# Patient Record
Sex: MALE | Race: WHITE | Employment: FULL TIME | URBAN - METROPOLITAN AREA
[De-identification: names, ages, dates, MRNs, and addresses within clinical notes are randomized per-mention and may not be internally consistent; named-entity substitution may affect disease eponyms.]

---

## 2017-03-13 ENCOUNTER — HOSPITAL ENCOUNTER (OUTPATIENT)
Dept: LAB | Age: 67
Discharge: HOME | End: 2017-03-13
Payer: COMMERCIAL

## 2017-03-13 LAB
BASOPHILS # BLD AUTO: 0 K/UL (ref 0–0.06)
BASOPHILS # BLD: 0 % (ref 0–2)
DIFFERENTIAL METHOD BLD: ABNORMAL
EOSINOPHIL # BLD: 0.2 K/UL (ref 0–0.4)
EOSINOPHIL NFR BLD: 5 % (ref 0–5)
ERYTHROCYTE [DISTWIDTH] IN BLOOD BY AUTOMATED COUNT: 13.4 % (ref 11.6–14.5)
HCT VFR BLD AUTO: 39.7 % (ref 36–48)
HGB BLD-MCNC: 13.7 G/DL (ref 13–16)
LYMPHOCYTES # BLD AUTO: 42 % (ref 21–52)
LYMPHOCYTES # BLD: 1.5 K/UL (ref 0.9–3.6)
MCH RBC QN AUTO: 31.7 PG (ref 24–34)
MCHC RBC AUTO-ENTMCNC: 34.5 G/DL (ref 31–37)
MCV RBC AUTO: 91.9 FL (ref 74–97)
MONOCYTES # BLD: 0.6 K/UL (ref 0.05–1.2)
MONOCYTES NFR BLD AUTO: 17 % (ref 3–10)
NEUTS SEG # BLD: 1.3 K/UL (ref 1.8–8)
NEUTS SEG NFR BLD AUTO: 36 % (ref 40–73)
PLATELET # BLD AUTO: 156 K/UL (ref 135–420)
PMV BLD AUTO: 10.5 FL (ref 9.2–11.8)
RBC # BLD AUTO: 4.32 M/UL (ref 4.7–5.5)
WBC # BLD AUTO: 3.5 K/UL (ref 4.6–13.2)

## 2017-03-13 PROCEDURE — 36415 COLL VENOUS BLD VENIPUNCTURE: CPT | Performed by: INTERNAL MEDICINE

## 2017-03-13 PROCEDURE — 85025 COMPLETE CBC W/AUTO DIFF WBC: CPT | Performed by: INTERNAL MEDICINE

## 2023-11-28 ENCOUNTER — HOSPITAL ENCOUNTER (OUTPATIENT)
Facility: HOSPITAL | Age: 73
Setting detail: RECURRING SERIES
Discharge: HOME | End: 2023-12-01
Payer: COMMERCIAL

## 2023-11-28 PROCEDURE — 97110 THERAPEUTIC EXERCISES: CPT

## 2023-11-28 PROCEDURE — 97161 PT EVAL LOW COMPLEX 20 MIN: CPT

## 2023-11-28 NOTE — PROGRESS NOTES
[facility] at [facility], [address] [place] [address] Drive  Phone: [phone]   Fax: [phone]    Plan of Care/ Statement of Necessity for Physical Therapy Services        Patient name: Veronica Trent Start of Care: 2023   Referral source: Arthur Gallego MD : 1950    Medical Diagnosis: Pain in left knee [M25.562]  Pain in right knee [M25.561]      Onset Date:10/15/2023    Treatment Diagnosis:  M25.561  RIGHT KNEE PAIN and M25.562  LEFT KNEE PAIN                                           Prior Hospitalization: see medical history Provider#: [id number]   Medications: Verified on Patient Summary List     Comorbidities: obesity, pre-diabetic, sleep apnea. Surgical Hx: four lumbar surgeries, fused T10-S1  Prior Level of Function: ambulatory community distances without device, no difficulty with gait on level or stairs      The Plan of Care and following information is based on the information from the initial evaluation. Assessment/ key information: Patient presents with report of recent relatively sudden onset bilateral LE functional weakness with marked difficulty safely performing bed, chair and toilet transfers, inability to ambulate without rolling walker, and recurrent falls due to knees bucking during ambulation. Patient also exhibits decreased coordination bilateral LE and decreased sensation bilateral plantar surfaces of feet, indicating likelihood of neurological component to functional strength deficits. Patient reports he has difficulty transporting self to clinic and may seek transition to home PT.  Patient instructed to discuss home versus outpatient PT with referring MD.    Patient will continue to benefit from skilled PT services to modify and progress therapeutic interventions, address functional mobility deficits, address ROM deficits, address strength deficits, analyze and address soft tissue restrictions, analyze and cue movement patterns, analyze and
30 Degrees [x] Neg    [] Pos Ely's Test  [x] Neg    [] Pos  Apley's Compression [x] Neg    [] Pos Marcell's Test  [x] Neg    [] Pos  Apley's Distraction [x] Neg    [] Pos Stroke Test  [] Neg    [] Pos   Anterior Drawer [x] Neg    [] Pos Fluctuation Test [] Neg    [] Pos  Thessaly's Test:                  [] Neg    [] Pos     Lever Sign:              Other tests/comments:   30 Second Sit to Stand Test: 5 repetitions with heavy use of arm rests  TUG Test: 54.5 seconds with use of rw and heavy use of arm rests to rise   Heel to Roger Coordination Test: moderate difficulty with hip and knee control bilaterally    Pain Level (0-10 scale) post treatment: 0    ASSESSMENT/Changes in Function: Patient presents with report of recent relatively sudden onset bilateral LE functional weakness with marked difficulty safely performing bed, chair and toilet transfers, inability to ambulate without rolling walker, and recurrent falls due to knees bucking during ambulation. Patient also exhibits decreased coordination bilateral LE and decreased sensation bilateral plantar surfaces of feet, indicating likelihood of neurological component to functional strength deficits. Patient reports he has difficulty transporting self to clinic and may seek transition to home PT. Patient instructed to discuss home versus outpatient PT with referring MD.    Patient will continue to benefit from skilled PT services to modify and progress therapeutic interventions, address functional mobility deficits, address ROM deficits, address strength deficits, analyze and address soft tissue restrictions, analyze and cue movement patterns, analyze and modify body mechanics/ergonomics and assess and modify postural abnormalities to attain remaining goals.      [x]  See Plan of Care  []  See progress note/recertification  []  See Discharge Summary         Progress towards goals / Updated goals:  See POC    PLAN  []  Upgrade activities as tolerated     [x]  Continue

## 2023-12-08 ENCOUNTER — TELEPHONE (OUTPATIENT)
Facility: HOSPITAL | Age: 73
End: 2023-12-08

## 2023-12-08 NOTE — TELEPHONE ENCOUNTER
Per conversation with patient, he is having additional testing done week before [facility]. Office to follow up on 12/21 with patient.

## 2023-12-28 ENCOUNTER — TELEPHONE (OUTPATIENT)
Facility: HOSPITAL | Age: 73
End: 2023-12-28

## 2023-12-29 ENCOUNTER — HOSPITAL ENCOUNTER (OUTPATIENT)
Facility: HOSPITAL | Age: 73
Discharge: HOME | End: 2023-12-29
Attending: ORTHOPAEDIC SURGERY
Payer: COMMERCIAL

## 2023-12-29 DIAGNOSIS — M54.50 LOW BACK PAIN, UNSPECIFIED BACK PAIN LATERALITY, UNSPECIFIED CHRONICITY, UNSPECIFIED WHETHER SCIATICA PRESENT: ICD-10-CM

## 2023-12-29 PROCEDURE — 72146 MRI CHEST SPINE W/O DYE: CPT

## 2024-06-03 ENCOUNTER — TELEPHONE (OUTPATIENT)
Facility: HOSPITAL | Age: 74
End: 2024-06-03

## 2024-06-19 ENCOUNTER — TELEPHONE (OUTPATIENT)
Facility: HOSPITAL | Age: 74
End: 2024-06-19

## 2024-06-21 ENCOUNTER — HOSPITAL ENCOUNTER (OUTPATIENT)
Facility: HOSPITAL | Age: 74
Setting detail: RECURRING SERIES
Discharge: HOME | End: 2024-06-24
Payer: COMMERCIAL

## 2024-06-21 PROCEDURE — 97161 PT EVAL LOW COMPLEX 20 MIN: CPT

## 2024-06-21 PROCEDURE — 97110 THERAPEUTIC EXERCISES: CPT

## 2024-06-21 NOTE — PROGRESS NOTES
[facility] at [facility]  [address] [address]  Phone: [phone]   Fax: [phone]    Plan of Care/ Statement of Necessity for Physical Therapy Services        Patient name: Dontrell Helm Start of Care: 2024   Referral source: Serenity Tran PA : 1950    Medical Diagnosis: Pain in thoracic spine [M54.6]  Other low back pain [M54.59]      Onset Date:3/25/2024    Treatment Diagnosis:  M54.6  THORACIC PAIN and M54.59  OTHER LOWER BACK PAIN                                          Prior Hospitalization: see medical history Provider#: [id number]   Medications: Verified on Patient Summary List     Comorbidities: multiple spinal surgeries, recurrent falls, hearing impaired, HTN  Prior Level of Function: independent with community ambulation w/o device, regularly walks dog one mile.      The Plan of Care and following information is based on the information from the initial evaluation.  Assessment/ key information: Patient presents with intermittent thoracic and lumbar pain; balance and proprioception deficiencies as well as gait abnormalities that have resulted in repeated falls and safety concerns; and strength and mobility deficits s/p T4-iliac fusion 3/25/2024. Patient reports over a decade history of thoracic and lumbar surgeries to relieve pain and is motivated to participate in PT to return to performing household duties, ambulating in the community and preventing future falls.      Patient will continue to benefit from skilled PT services to modify and progress therapeutic interventions, address functional mobility deficits, address ROM deficits, address strength deficits, analyze and address soft tissue restrictions, analyze and cue movement patterns, analyze and modify body mechanics/ergonomics and assess and modify postural abnormalities to attain remaining goals.    Evaluation Complexity HistoryHIGH Complexity :3+ comorbidities / personal factors will

## 2024-06-21 NOTE — PROGRESS NOTES
PT DAILY TREATMENT NOTE/LUMBAR EVAL 10-18    Patient Name: Dontrell Helm  Date:2024  : 1950  [x]  Patient  Verified  Payor: VARSHA / Plan: MCKAYLA MADDOX FEDERAL / Product Type: *No Product type* /    In time:1410  Out time:1500  Total Treatment Time (min): 50  Visit #: 1 of 15    Treatment Area: Pain in thoracic spine [M54.6]  Other low back pain [M54.59]  SUBJECTIVE  Pain Level (0-10 scale): 0-7/10  []constant [x]intermittent [x]improving []worsening []no change since onset    Any medication changes, allergies to medications, adverse drug reactions, diagnosis change, or new procedure performed?: [x] No    [] Yes (see summary sheet for update)  Subjective functional status/changes:       Patient presents s/p surgical fusion T4-S1 performed 3/25/2024 due to prior thoracic and lumbar fractures from falls and resultant severe pain and loss of function. Patient received post op inpatient and home PT with significant reduction in pain noted but persistence of bilateral LE weakness, reduced bilateral LE motor control and persistence of recurrent falls. Patient now seeking PT intervention to assist in restoring functional mobility and maximizing safety in mobility. Patient describes pain as intermittent and is also experiencing numbness in his feet bilaterally. Denies popping/clicking. Aggravating factors:static positions. Alleviating factors: changing positions.  Denies red flags: SOB, chest pain, dizziness/lightheadedness, blurred/double vision, HA, chills/fevers, night sweats, change in bowel/bladder control, abdominal pain, difficulty swallowing, slurred speech, unexplained weight gain/loss, nausea, vomiting. PMHx: hearing impaired, HTN . Surgical Hx: Thyroid removal, multiple back fusions, left great toe fusion. Social Hx: wife uses walker, hiring help for dog, two story home, other family 30+ minutes away, work status: full time employment doing administrative duties, some travel. PLOF: independent in

## 2024-06-24 ENCOUNTER — TELEPHONE (OUTPATIENT)
Facility: HOSPITAL | Age: 74
End: 2024-06-24

## 2024-07-03 ENCOUNTER — HOSPITAL ENCOUNTER (OUTPATIENT)
Facility: HOSPITAL | Age: 74
Setting detail: RECURRING SERIES
Discharge: HOME | End: 2024-07-06
Payer: COMMERCIAL

## 2024-07-03 PROCEDURE — 97112 NEUROMUSCULAR REEDUCATION: CPT

## 2024-07-03 PROCEDURE — 97110 THERAPEUTIC EXERCISES: CPT

## 2024-07-03 PROCEDURE — 97530 THERAPEUTIC ACTIVITIES: CPT

## 2024-07-03 NOTE — PROGRESS NOTES
Long Term Goals: To be accomplished in 8 weeks:                 Patient will increase Abdullahi LE strength to 5/5 MMT throughout to aid in completion of ADLs.                 Status at IE: 3+ Hip flex, knee flex/ext, ankle dorsiflexion/plantarflexion                 Current: Same as IE                    Patient will report pain no greater than 1-3/10 throughout entire day to aid in completion of ADLs.                 Status at IE: 4-7/10                 Current: Same as IE                    Patient will decrease TUG by 14 seconds to demonstrate increased safety in mobility.                 Status at IE: 26.04 with RW                 Current: Same as IE                    Patient will improve 30 second sit to stand score to 11 repetitions to demonstrate increased proximal bilateral LE strength and associated increased safety in mobility.                 Status at IE: 5 w/ CG                 Current: Same as IE                    Patient will improve Oswestry Disability Index for Low Back Pain/Dysfunction score by MCID of 13%  to demonstrate improvement in functional status.                 Status at IE:36% ability (64%disability)                 Current: Same as IE    PLAN  Yes  Continue plan of care  []  Upgrade activities as tolerated  []  Discharge due to:   []  Other:    Jaelyn Ignacio, SPT    7/3/2024    3:52 PM    Future Appointments   Date Time Provider Department Center   7/12/2024  1:30 PM Geovani Ji, [facility]   7/19/2024  1:30 PM Geovani Ji, PT [facility]   7/26/2024  1:30 PM Geovani Ji, [facility]

## 2024-07-12 ENCOUNTER — HOSPITAL ENCOUNTER (OUTPATIENT)
Facility: HOSPITAL | Age: 74
Setting detail: RECURRING SERIES
Discharge: HOME | End: 2024-07-15
Payer: COMMERCIAL

## 2024-07-12 PROCEDURE — 97530 THERAPEUTIC ACTIVITIES: CPT

## 2024-07-12 PROCEDURE — 97110 THERAPEUTIC EXERCISES: CPT

## 2024-07-12 PROCEDURE — 97112 NEUROMUSCULAR REEDUCATION: CPT

## 2024-07-12 NOTE — PROGRESS NOTES
PHYSICAL / OCCUPATIONAL THERAPY - DAILY TREATMENT NOTE (updated )    Patient Name: Dontrell Helm    Date: 2024    : 1950  Insurance: Payor: VARSHA / Plan: MCKAYLA MADDOX FEDERAL / Product Type: *No Product type* /      Patient  verified Yes     Visit #   Current / Total 3 16   Time   In / Out 1330 1426   Pain   In / Out 4 4   Subjective Functional Status/Changes: \"Life has been busy this past week. My wife and I are both patients. Pain is staying the same.\"   Changes to:  Meds, Allergies, Med Hx, Sx Hx?  If yes, update Summary List no       TREATMENT AREA =  Pain in thoracic spine [M54.6]  Other low back pain [M54.59]    OBJECTIVE    Therapeutic Procedures:  Tx Min Billable or 1:1 Min (if diff from Tx Min) Procedure, Rationale, Specifics   26 21 65358 Therapeutic Exercise (timed):  increase ROM, strength, coordination, balance, and proprioception to improve patient's ability to progress to PLOF and address remaining functional goals. (see flow sheet as applicable)     Details if applicable:       15  33795 Therapeutic Activity (timed):  use of dynamic activities replicating functional movements to increase ROM, strength, coordination, balance, and proprioception in order to improve patient's ability to progress to PLOF and address remaining functional goals.  (see flow sheet as applicable)     Details if applicable:     15  02304 Neuromuscular Re-Education (timed):  improve balance, coordination, kinesthetic sense, posture, core stability and proprioception to improve patient's ability to develop conscious control of individual muscles and awareness of position of extremities in order to progress to PLOF and address remaining functional goals. (see flow sheet as applicable)     Details if applicable:     56 51 [facility] Totals Reminder: bill using total billable min of TIMED therapeutic procedures (example: do not include dry needle or estim unattended, both untimed codes, in totals to left)  8-22 min

## 2024-07-19 ENCOUNTER — HOSPITAL ENCOUNTER (OUTPATIENT)
Facility: HOSPITAL | Age: 74
Setting detail: RECURRING SERIES
Discharge: HOME | End: 2024-07-22
Payer: COMMERCIAL

## 2024-07-19 PROCEDURE — 97530 THERAPEUTIC ACTIVITIES: CPT

## 2024-07-19 PROCEDURE — 97112 NEUROMUSCULAR REEDUCATION: CPT

## 2024-07-19 PROCEDURE — 97110 THERAPEUTIC EXERCISES: CPT

## 2024-07-19 NOTE — PROGRESS NOTES
PHYSICAL / OCCUPATIONAL THERAPY - DAILY TREATMENT NOTE (updated )    Patient Name: Dontrell Helm    Date: 2024    : 1950  Insurance: Payor: VARSHA / Plan: MCKAYLA MADDOX FEDERAL / Product Type: *No Product type* /      Patient  verified Yes     Visit #   Current / Total 4 16   Time   In / Out 1320 1419   Pain   In / Out 4 4   Subjective Functional Status/Changes: \"I had to get up in the middle of night last and I had a lot of fear getting around in the dark.\"   Changes to:  Meds, Allergies, Med Hx, Sx Hx?  If yes, update Summary List no       TREATMENT AREA =  Pain in thoracic spine [M54.6]  Other low back pain [M54.59]    OBJECTIVE    Therapeutic Procedures:  Tx Min Billable or 1:1 Min (if diff from Tx Min) Procedure, Rationale, Specifics   29 24 53211 Therapeutic Exercise (timed):  increase ROM, strength, coordination, balance, and proprioception to improve patient's ability to progress to PLOF and address remaining functional goals. (see flow sheet as applicable)     Details if applicable:       15  89858 Therapeutic Activity (timed):  use of dynamic activities replicating functional movements to increase ROM, strength, coordination, balance, and proprioception in order to improve patient's ability to progress to PLOF and address remaining functional goals.  (see flow sheet as applicable)     Details if applicable:     15  57536 Neuromuscular Re-Education (timed):  improve balance, coordination, kinesthetic sense, posture, core stability and proprioception to improve patient's ability to develop conscious control of individual muscles and awareness of position of extremities in order to progress to PLOF and address remaining functional goals. (see flow sheet as applicable)     Details if applicable:     59 54 [facility] Totals Reminder: bill using total billable min of TIMED therapeutic procedures (example: do not include dry needle or estim unattended, both untimed codes, in totals to left)  8-

## 2024-07-26 ENCOUNTER — HOSPITAL ENCOUNTER (OUTPATIENT)
Facility: HOSPITAL | Age: 74
Setting detail: RECURRING SERIES
Discharge: HOME | End: 2024-07-29
Payer: COMMERCIAL

## 2024-07-26 PROCEDURE — 97110 THERAPEUTIC EXERCISES: CPT

## 2024-07-26 PROCEDURE — 97530 THERAPEUTIC ACTIVITIES: CPT

## 2024-07-26 PROCEDURE — 97112 NEUROMUSCULAR REEDUCATION: CPT

## 2024-07-26 NOTE — PROGRESS NOTES
38-52 min = 3 units; 53-67 min = 4 units; 68-82 min = 5 units   Total Total       TOTAL TREATMENT TIME:   60       [x]  Patient Education billed concurrently with other procedures   [x] Review HEP    [] Progressed/Changed HEP, detail:    [] Other detail:       Objective Information/Functional Measures/Assessment  Patient has been consistent and diligent with his HEP, well motivated at PT and as such has shown progress towards his pain reduction, improved ROM and functional activity goals. Patient was introduced to an AFO with propulsion that can be attached to the outside of his shoe. The device was placed on his right foot as a way to assist with his right foot drop. Patient reported feeling the device provided assistance and could see in a mirror the improvements it made in his gate. Patient was provided with documentation and contact information if he was interested in pursuing having one fitted.    Patient will continue to benefit from skilled PT / OT services to modify and progress therapeutic interventions, analyze and address functional mobility deficits, analyze and address ROM deficits, analyze and address strength deficits, analyze and address soft tissue restrictions, analyze and cue for proper movement patterns, analyze and modify for postural abnormalities, analyze and address imbalance/dizziness, and instruct in home and community integration to address functional deficits and attain remaining goals.    Progress toward goals / Updated goals:  [x]  See Progress Note/Recertification    Short Term Goals: To be accomplished in 4 weeks:                 Patient will report compliance with HEP 1x/day to aid in rehabilitation program.                 Status at IE: Patient instructed in and provided written copy of initial Home Exercise Program.                 Current: Patient reports good compliance with current HEP. In-progress 7/12/2024                    Patient will increase lumbar flexion ROM to 10 
for functional progress to achieving above stated clinically significant goals. Continue per plan of care.     Thank you for this referral.   Geovani Ji, PT 7/26/2024 3:12 PM

## 2024-07-30 ENCOUNTER — TELEPHONE (OUTPATIENT)
Facility: HOSPITAL | Age: 74
End: 2024-07-30

## 2024-07-30 NOTE — TELEPHONE ENCOUNTER
Called pt. to see if he wants to come in on the openings today since he was on the waitlist. He delcined and said he never asked to be on the waitlist. Pt. has been removed and reminded about her 8/30 appt.

## 2024-08-30 ENCOUNTER — HOSPITAL ENCOUNTER (OUTPATIENT)
Facility: HOSPITAL | Age: 74
Setting detail: RECURRING SERIES
End: 2024-08-30
Payer: COMMERCIAL

## 2024-08-30 PROCEDURE — 97112 NEUROMUSCULAR REEDUCATION: CPT

## 2024-08-30 PROCEDURE — 97110 THERAPEUTIC EXERCISES: CPT

## 2024-08-30 PROCEDURE — 97530 THERAPEUTIC ACTIVITIES: CPT

## 2024-08-30 NOTE — PROGRESS NOTES
PHYSICAL / OCCUPATIONAL THERAPY - DAILY TREATMENT NOTE (updated )    Patient Name: Dontrell Helm    Date: 2024    : 1950  Insurance: Payor: VARSHA / Plan: MCKAYLA MADDOX FEDERAL / Product Type: *No Product type* /      Patient  verified Yes     Visit #   Current / Total 6 16   Time   In / Out 1330 1422   Pain   In / Out 2 2   Subjective Functional Status/Changes: \"I was unable to come in the past four weeks because of my schedule. I am hoping with continued PT I can progress to being able to walk my dog out in the neighborhood.\"   Changes to:  Meds, Allergies, Med Hx, Sx Hx?  If yes, update Summary List no       TREATMENT AREA =  Pain in thoracic spine [M54.6]  Other low back pain [M54.59]    If an interpreting service is utilized for treatment of this patient, the contents of this document represent the material reviewed with the patient via the .     OBJECTIVE    Therapeutic Procedures:  Tx Min Billable or 1:1 Min (if diff from Tx Min) Procedure, Rationale, Specifics   25 20 79761 Therapeutic Exercise (timed):  increase ROM, strength, coordination, balance, and proprioception to improve patient's ability to progress to PLOF and address remaining functional goals. (see flow sheet as applicable)     Details if applicable:       15  90241 Therapeutic Activity (timed):  use of dynamic activities replicating functional movements to increase ROM, strength, coordination, balance, and proprioception in order to improve patient's ability to progress to PLOF and address remaining functional goals.  (see flow sheet as applicable)     Details if applicable:     12  23525 Neuromuscular Re-Education (timed):  improve balance, coordination, kinesthetic sense, posture, core stability and proprioception to improve patient's ability to develop conscious control of individual muscles and awareness of position of extremities in order to progress to PLOF and address remaining functional goals. (see flow  sheet as applicable)     Details if applicable:     52 47 [facility] Totals Reminder: bill using total billable min of TIMED therapeutic procedures (example: do not include dry needle or estim unattended, both untimed codes, in totals to left)  8-22 min = 1 unit; 23-37 min = 2 units; 38-52 min = 3 units; 53-67 min = 4 units; 68-82 min = 5 units   Total Total       TOTAL TREATMENT TIME:        52       [x]  Patient Education billed concurrently with other procedures   [x] Review HEP    [] Progressed/Changed HEP, detail:    [] Other detail:       Objective Information/Functional Measures/Assessment    Patient returning to re-start PT after four week absence from attending PT due to limitations in personal schedule and caring for infirm spouse. Patient states has noted moderate decrease in lumbar pain intensity and improving mobility at home. Patient states primary personal goal is to progress safety and independence in mobility to the point he can ambulate in community. Patient educated regarding risks/benefits of walker versus rollator versus quad cane and SPC and assisted in performing trial with each device. Patient has highest mobility and safety with rollator. Provided patient information regarding purchase options for rollator. Also advised patient to follow up with orthotist for purchase of left AFO to further improve safety in mobility.    Patient will continue to benefit from skilled PT / OT services to modify and progress therapeutic interventions, analyze and address functional mobility deficits, analyze and address ROM deficits, analyze and address strength deficits, analyze and address soft tissue restrictions, analyze and cue for proper movement patterns, analyze and modify for postural abnormalities, analyze and address imbalance/dizziness, and instruct in home and community integration to address functional deficits and attain remaining goals.    Progress toward goals / Updated goals:  []  See Progress

## 2024-09-06 ENCOUNTER — HOSPITAL ENCOUNTER (OUTPATIENT)
Facility: HOSPITAL | Age: 74
Setting detail: RECURRING SERIES
Discharge: HOME | End: 2024-09-09
Payer: COMMERCIAL

## 2024-09-06 PROCEDURE — 97530 THERAPEUTIC ACTIVITIES: CPT

## 2024-09-06 PROCEDURE — 97110 THERAPEUTIC EXERCISES: CPT

## 2024-09-06 PROCEDURE — 97112 NEUROMUSCULAR REEDUCATION: CPT

## 2024-09-06 NOTE — PROGRESS NOTES
[facility] at [facility]  [address] [address]                                                                                      Phone: [phone]   Fax: [phone]    Progress Note  Patient name: Dontrell Helm Start of Care: 2024   Referral source: Serenity Tran PA : 1950               Medical Diagnosis: Pain in thoracic spine [M54.6]  Other low back pain [M54.59]      Onset Date:3/25/2024               Treatment Diagnosis:  M54.6  THORACIC PAIN and M54.59  OTHER LOWER BACK PAIN                                          Prior Hospitalization: see medical history Provider#: [id number]   Medications: Verified on Patient Summary List      Comorbidities: multiple spinal surgeries, recurrent falls, hearing impaired, HTN  Prior Level of Function: independent with community ambulation w/o device, regularly walks dog one mile.     Visits from Start of Care: 6                                      Missed Visits: 2    Updated Goals/Measure of Progress: To be achieved in 4 weeks:    Short Term Goals: To be accomplished in 4 weeks:                 Patient will report compliance with HEP 1x/day to aid in rehabilitation program.                 Status at IE: Patient instructed in and provided written copy of initial Home Exercise Program.                 Current: Patient reports good compliance with current HEP.            In-progress 2024                    Patient will increase lumbar flexion ROM to 10 inches from the floor  to aid in completion of ADLs.                 Status at IE: 16.5 inches                 Current: 12.0 inches   In-progress, 2024     Long Term Goals: To be accomplished in 8 weeks:                 Patient will increase Abdullahi LE strength to 5/5 MMT throughout to aid in completion of ADLs.                 Status at IE: 3+ Hip flex, knee flex/ext, ankle dorsiflexion/plantarflexion                 Current: Patient able to kneel and return to

## 2024-09-13 ENCOUNTER — HOSPITAL ENCOUNTER (OUTPATIENT)
Facility: HOSPITAL | Age: 74
Setting detail: RECURRING SERIES
Discharge: HOME | End: 2024-09-16
Payer: COMMERCIAL

## 2024-09-13 PROCEDURE — 97530 THERAPEUTIC ACTIVITIES: CPT

## 2024-09-13 PROCEDURE — 97110 THERAPEUTIC EXERCISES: CPT

## 2024-09-13 PROCEDURE — 97112 NEUROMUSCULAR REEDUCATION: CPT

## 2024-09-16 ENCOUNTER — TELEPHONE (OUTPATIENT)
Facility: HOSPITAL | Age: 74
End: 2024-09-16

## 2024-09-19 ENCOUNTER — TELEPHONE (OUTPATIENT)
Facility: HOSPITAL | Age: 74
End: 2024-09-19

## 2024-09-20 ENCOUNTER — APPOINTMENT (OUTPATIENT)
Facility: HOSPITAL | Age: 74
End: 2024-09-20
Payer: COMMERCIAL

## 2024-09-24 ENCOUNTER — TELEPHONE (OUTPATIENT)
Facility: HOSPITAL | Age: 74
End: 2024-09-24

## 2024-10-04 ENCOUNTER — HOSPITAL ENCOUNTER (OUTPATIENT)
Facility: HOSPITAL | Age: 74
Setting detail: RECURRING SERIES
End: 2024-10-04
Payer: COMMERCIAL

## 2024-10-04 ENCOUNTER — TELEPHONE (OUTPATIENT)
Facility: HOSPITAL | Age: 74
End: 2024-10-04

## 2024-10-11 ENCOUNTER — HOSPITAL ENCOUNTER (OUTPATIENT)
Facility: HOSPITAL | Age: 74
Setting detail: RECURRING SERIES
Discharge: HOME | End: 2024-10-14
Payer: COMMERCIAL

## 2024-10-11 PROCEDURE — 97530 THERAPEUTIC ACTIVITIES: CPT

## 2024-10-11 PROCEDURE — 97110 THERAPEUTIC EXERCISES: CPT

## 2024-10-11 PROCEDURE — 97112 NEUROMUSCULAR REEDUCATION: CPT

## 2024-10-11 NOTE — PROGRESS NOTES
PHYSICAL / OCCUPATIONAL THERAPY - DAILY TREATMENT NOTE (updated )    Patient Name: Dontrell Helm    Date: 10/11/2024    : 1950  Insurance: Payor: [employer] / Plan: [employer] FEDERAL / Product Type: *No Product type* /      Patient  verified Yes     Visit #   Current / Total 9 16   Time   In / Out 1450 1528   Pain   In / Out 4 3   Subjective Functional Status/Changes: \"The pain between my shoulder blades has come back recently. I think it flared up when I was reaching and twisting behind me when I was walking.\"   Changes to:  Meds, Allergies, Med Hx, Sx Hx?  If yes, update Summary List no       TREATMENT AREA =  Pain in thoracic spine [M54.6]  Other low back pain [M54.59]    If an interpreting service is utilized for treatment of this patient, the contents of this document represent the material reviewed with the patient via the .     OBJECTIVE    Therapeutic Procedures:  Tx Min Billable or 1:1 Min (if diff from Tx Min) Procedure, Rationale, Specifics   15  96778 Therapeutic Exercise (timed):  increase ROM, strength, coordination, balance, and proprioception to improve patient's ability to progress to PLOF and address remaining functional goals. (see flow sheet as applicable)     Details if applicable:       13  82586 Therapeutic Activity (timed):  use of dynamic activities replicating functional movements to increase ROM, strength, coordination, balance, and proprioception in order to improve patient's ability to progress to PLOF and address remaining functional goals.  (see flow sheet as applicable)     Details if applicable:     10  97819 Neuromuscular Re-Education (timed):  improve balance, coordination, kinesthetic sense, posture, core stability and proprioception to improve patient's ability to develop conscious control of individual muscles and awareness of position of extremities in order to progress to PLOF and address remaining functional goals. (see flow sheet as applicable)

## 2024-10-11 NOTE — PROGRESS NOTES
[facility]  [address] [address]  Phone: [phone]   Fax: [phone]    Discharge Summary    Patient name: Dontrell Helm Start of Care: 2024   Referral source: Serenity Tran PA : 1950               Medical Diagnosis: Pain in thoracic spine [M54.6]  Other low back pain [M54.59]      Onset Date:3/25/2024               Treatment Diagnosis:  M54.6  THORACIC PAIN and M54.59  OTHER LOWER BACK PAIN                                          Prior Hospitalization: see medical history Provider#: [id number]   Medications: Verified on Patient Summary List      Comorbidities: multiple spinal surgeries, recurrent falls, hearing impaired, HTN  Prior Level of Function: independent with community ambulation w/o device, regularly walks dog one mile.     Visits from Start of Care: 9                                      Missed Visits: 3    Reporting Period : 24 to 10/11/24    Goals/Measure of Progress:  Short Term Goals: To be accomplished in 4 weeks:                 Patient will report compliance with HEP 1x/day to aid in rehabilitation program.                 Status at IE: Patient instructed in and provided written copy of initial Home Exercise Program.                 Current: Patient reports recent decreased compliance with HEP due to family medical issues but states will improve in future.           In-progress, 10/11/2024                    Patient will increase lumbar flexion ROM to 10 inches from the floor  to aid in completion of ADLs.                 Status at IE: 16.5 inches                 Current: 10.5 inches   In-progress, 10/11/2024     Long Term Goals: To be accomplished in 8 weeks:                 Patient will increase Abdullahi LE strength to 5/5 MMT throughout to aid in completion of ADLs.                 Status at IE: 3+ Hip flex, knee flex/ext, ankle dorsiflexion/plantarflexion                 Current: 4 Hip flex, knee flex/ext, ankle

## 2024-10-23 ENCOUNTER — TELEPHONE (OUTPATIENT)
Facility: HOSPITAL | Age: 74
End: 2024-10-23

## 2024-10-29 ENCOUNTER — TELEPHONE (OUTPATIENT)
Facility: HOSPITAL | Age: 74
End: 2024-10-29

## 2024-10-29 NOTE — TELEPHONE ENCOUNTER
Patient updated Consent and Release form in order to send documentation to [facility] at [facility] to obtain AFO. Form has been scanned into [employer].

## 2024-11-12 ENCOUNTER — TELEPHONE (OUTPATIENT)
Facility: HOSPITAL | Age: 74
End: 2024-11-12